# Patient Record
Sex: MALE | Race: WHITE
[De-identification: names, ages, dates, MRNs, and addresses within clinical notes are randomized per-mention and may not be internally consistent; named-entity substitution may affect disease eponyms.]

---

## 2018-06-13 ENCOUNTER — HOSPITAL ENCOUNTER (EMERGENCY)
Dept: HOSPITAL 80 - FED | Age: 63
Discharge: HOME | End: 2018-06-13
Payer: COMMERCIAL

## 2018-06-13 VITALS — SYSTOLIC BLOOD PRESSURE: 132 MMHG | DIASTOLIC BLOOD PRESSURE: 78 MMHG

## 2018-06-13 DIAGNOSIS — Z79.82: ICD-10-CM

## 2018-06-13 DIAGNOSIS — R00.2: Primary | ICD-10-CM

## 2018-06-13 LAB — PLATELET # BLD: 261 10^3/UL (ref 150–400)

## 2018-06-13 NOTE — EDPHY
H & P


Time Seen by Provider: 06/13/18 12:05


HPI/ROS: 





CHIEF COMPLAINT:  Rapid heart rate





HISTORY OF PRESENT ILLNESS:  Patient states he has had"heart flutters my whole 

life"and was noted to have PACs when he had multiple orthopedic surgeries last 

year.  They have since recently increased to about 3 times a week lasting 2-3 

hours.  He also has sleep apnea.  Last night around 10:00 p.m. In his recliner 

he noticed his heart was racing at twice normal speed was able to fall sleep 90 

min later.  Today at 9:00 a.m. It happened again lasted about 2 hr and went to 

urgent care and referred here.  Not associated with chest pain or near syncope 

or syncope.


Not irregular.  Not associated with shortness of breath or leg swelling but 

does cause some anxiety.  Not better or worse with anything.





REVIEW OF SYSTEMS:


Eye:  Gets"Tiger stripes"in his vision every other month and thinks it might be 

due to low blood sugar, nothing today


ENT: no sore throat


Cardiac:  No chest pain


Pulmonary: no cough or SOB


Abdomen: no vomiting, diarrhea, abdominal pain


Musculoskeletal:  No leg swelling


Skin: no rash


Neuro: no headache


Constitutional: no fever


: no urinary symptoms





A comprehensive 10 point review of systems is otherwise negative aside from 

elements mentioned in the history of present illness.





PAST MEDICAL HISTORY:  Surgeries x3 on his shoulder and elbow x1 and last year, 

known PACs





Social history:  Nonsmoker, no drugs, here with his wife.  No leg swelling or 

immobilization.  Recently rode his bike hard with no symptoms.





General Appearance: Alert and conversant, cooperative.


Eyes: No scleral  icterus. 


ENT, Mouth: Normal mucous membranes.


Respiratory: Normal respiratory effort, breath sounds equal, lungs are clear to 

auscultation.


Cardiovascular:  Regular rate and rhythm.  Frequent extrasystoles.


Gastrointestinal:  Abdomen is soft and non tender.


Neurological: Alert, face symmetric, normal motor and sensory in extremities. 


Skin: Warm and dry, no rashes.


Musculoskeletal: No peripheral edema.  No calf tenderness.


Psychiatric: Not agitated.





Emergency Department course/MDM:


Patient does not have symptoms of chest pain or syncope to suggest high risk 

for malignant dysrhythmia.  Plan for cardiology outpatient follow-up for 

portable Holter monitoring and clinic follow-up.  Labs to include troponin and 

electrolytes and TSH reviewed.





1325:  Discussed with Cardiology will have him follow up in the office today 

for Holter monitor.


Smoking Status: Never smoked


Constitutional: 


 Initial Vital Signs











Temperature (C)  36.6 C   06/13/18 10:58


 


Heart Rate  79   06/13/18 10:58


 


Respiratory Rate  16   06/13/18 10:58


 


Blood Pressure  126/72 H  06/13/18 10:58


 


O2 Sat (%)  95   06/13/18 10:58








 











O2 Delivery Mode               Room Air














Allergies/Adverse Reactions: 


 





No Known Allergies Allergy (Unverified 06/13/18 11:02)


 








Home Medications: 














 Medication  Instructions  Recorded


 


Aspirin  06/13/18














Medical Decision Making





- Diagnostics


EKG Interpretation: 





12-lead EKG interpreted by me; official reading is in trace master.  My 

interpretation is sinus rhythm with multiple PACs, otherwise normal.


Differential Diagnosis: 





Differential considered including but not limited to malignant dysrhythmia, 

atrial fibrillation, SVT, sinus tachycardia.


Consult/Admit Bed Type: [facility] at 1305





- Data Points


Laboratory Results: 


 Laboratory Results





 06/13/18 11:20 





 06/13/18 11:20 





 











  06/13/18 06/13/18 06/13/18





  11:26 11:20 11:20


 


WBC      5.56 10^3/uL 10^3/uL





     (3.80-9.50) 


 


RBC      4.67 10^6/uL 10^6/uL





     (4.40-6.38) 


 


Hgb      15.5 g/dL g/dL





     (13.7-17.5) 


 


Hct      45.3 % %





     (40.0-51.0) 


 


MCV      97.0 fL fL





     (81.5-99.8) 


 


MCH      33.2 pg pg





     (27.9-34.1) 


 


MCHC      34.2 g/dL g/dL





     (32.4-36.7) 


 


RDW      14.0 % %





     (11.5-15.2) 


 


Plt Count      261 10^3/uL 10^3/uL





     (150-400) 


 


MPV      11.6 fL fL





     (8.7-11.7) 


 


Neut % (Auto)      71.7 % %





     (39.3-74.2) 


 


Lymph % (Auto)      19.4 % %





     (15.0-45.0) 


 


Mono % (Auto)      6.3 % %





     (4.5-13.0) 


 


Eos % (Auto)      0.9 % %





     (0.6-7.6) 


 


Baso % (Auto)      1.3 % %





     (0.3-1.7) 


 


Nucleat RBC Rel Count      0.0 % %





     (0.0-0.2) 


 


Absolute Neuts (auto)      3.99 10^3/uL 10^3/uL





     (1.70-6.50) 


 


Absolute Lymphs (auto)      1.08 10^3/uL 10^3/uL





     (1.00-3.00) 


 


Absolute Monos (auto)      0.35 10^3/uL 10^3/uL





     (0.30-0.80) 


 


Absolute Eos (auto)      0.05 10^3/uL 10^3/uL





     (0.03-0.40) 


 


Absolute Basos (auto)      0.07 10^3/uL 10^3/uL





     (0.02-0.10) 


 


Absolute Nucleated RBC      0.00 10^3/uL 10^3/uL





     (0-0.01) 


 


Immature Gran %      0.4 % %





     (0.0-1.1) 


 


Immature Gran #      0.02 10^3/uL 10^3/uL





     (0.00-0.10) 


 


Sodium    139 mEq/L mEq/L  





    (135-145)  


 


Potassium    4.3 mEq/L mEq/L  





    (3.3-5.0)  


 


Chloride    104 mEq/L mEq/L  





    ()  


 


Carbon Dioxide    23 mEq/l mEq/l  





    (22-31)  


 


Anion Gap    12 mEq/L mEq/L  





    (8-16)  


 


BUN    17 mg/dL mg/dL  





    (7-23)  


 


Creatinine    0.9 mg/dL mg/dL  





    (0.7-1.3)  


 


Estimated GFR    > 60   





    


 


Glucose    92 mg/dL mg/dL  





    ()  


 


Calcium    8.8 mg/dL mg/dL  





    (8.5-10.4)  


 


POC Troponin I  0.00 ng/mL ng/mL    





   (0.00-0.08)   


 


TSH    0.602 uIU/mL uIU/mL  





    (0.465-4.680)  











Point of Care Test Results: 


 Chemistry











  06/13/18





  11:26


 


POC Troponin I  0.00 ng/mL ng/mL





   (0.00-0.08) 














Departure





- Departure


Disposition: Home, Routine, Self-Care


Clinical Impression: 


 Palpitations





Condition: Good


Instructions:  Heart Palpitations (ED)


Referrals: 


Roro Case MD [Primary Care Provider] - As per Instructions


Sue Miller PA [Physician Assistant] - As per Instructions (Go now to [facility], they are expecting you for 48 hour holter monitor; call the office 

number and ask for Sue if any problems.)

## 2018-06-13 NOTE — CPEKG
Heart Rate: 75

RR Interval: 800

P-R Interval: 169

QRSD Interval: 96

QT Interval: 420

QTC Interval: 470

P Axis: 70

QRS Axis: 54

T Wave Axis: 35

EKG Severity - ABNORMAL ECG -

EKG Impression: SINUS RHYTHM

EKG Impression: MULTIPLE ATRIAL PREMATURE COMPLEXES

EKG Impression: LOW VOLTAGE IN FRONTAL LEADS

Electronically Signed By: Benedict Cordero 13-Jun-2018 12:32:53